# Patient Record
Sex: FEMALE | Race: WHITE
[De-identification: names, ages, dates, MRNs, and addresses within clinical notes are randomized per-mention and may not be internally consistent; named-entity substitution may affect disease eponyms.]

---

## 2018-05-24 LAB
BASOPHILS # BLD AUTO: 0.1 10*3/UL (ref 0–0.1)
BASOPHILS NFR BLD AUTO: 1.5 % (ref 0–1)
DEPRECATED NEUTROPHILS # BLD AUTO: 3.5 10*3/UL (ref 2.1–6.9)
EOSINOPHIL # BLD AUTO: 0.6 10*3/UL (ref 0–0.4)
EOSINOPHIL NFR BLD AUTO: 8.2 % (ref 0–6)
ERYTHROCYTE [DISTWIDTH] IN CORD BLOOD: 13.5 % (ref 11.7–14.4)
HCT VFR BLD AUTO: 36.6 % (ref 34.2–44.1)
HGB BLD-MCNC: 12.1 G/DL (ref 12–16)
LYMPHOCYTES # BLD: 2 10*3/UL (ref 1–3.2)
LYMPHOCYTES NFR BLD AUTO: 30.1 % (ref 18–39.1)
MCH RBC QN AUTO: 30.3 PG (ref 28–32)
MCHC RBC AUTO-ENTMCNC: 33.1 G/DL (ref 31–35)
MCV RBC AUTO: 91.7 FL (ref 81–99)
MONOCYTES # BLD AUTO: 0.6 10*3/UL (ref 0.2–0.8)
MONOCYTES NFR BLD AUTO: 8.2 % (ref 4.4–11.3)
NEUTS SEG NFR BLD AUTO: 51.7 % (ref 38.7–80)
PLATELET # BLD AUTO: 226 X10E3/UL (ref 140–360)
RBC # BLD AUTO: 3.99 X10E6/UL (ref 3.6–5.1)

## 2018-05-31 ENCOUNTER — HOSPITAL ENCOUNTER (OUTPATIENT)
Dept: HOSPITAL 88 - OR | Age: 68
Discharge: HOME | End: 2018-05-31
Attending: INTERNAL MEDICINE
Payer: MEDICARE

## 2018-05-31 DIAGNOSIS — Z12.11: Primary | ICD-10-CM

## 2018-05-31 DIAGNOSIS — Z87.891: ICD-10-CM

## 2018-05-31 DIAGNOSIS — B18.2: ICD-10-CM

## 2018-05-31 DIAGNOSIS — I10: ICD-10-CM

## 2018-05-31 DIAGNOSIS — Z01.812: ICD-10-CM

## 2018-05-31 DIAGNOSIS — R00.1: ICD-10-CM

## 2018-05-31 DIAGNOSIS — Z01.810: ICD-10-CM

## 2018-05-31 DIAGNOSIS — Z88.5: ICD-10-CM

## 2018-05-31 DIAGNOSIS — K57.30: ICD-10-CM

## 2018-05-31 DIAGNOSIS — Z79.82: ICD-10-CM

## 2018-05-31 DIAGNOSIS — K63.5: ICD-10-CM

## 2018-05-31 DIAGNOSIS — Z80.0: ICD-10-CM

## 2018-05-31 DIAGNOSIS — I25.810: ICD-10-CM

## 2018-05-31 DIAGNOSIS — I25.2: ICD-10-CM

## 2018-05-31 DIAGNOSIS — K21.9: ICD-10-CM

## 2018-05-31 DIAGNOSIS — Z95.1: ICD-10-CM

## 2018-05-31 PROCEDURE — 93005 ELECTROCARDIOGRAM TRACING: CPT

## 2018-05-31 PROCEDURE — 85025 COMPLETE CBC W/AUTO DIFF WBC: CPT

## 2018-05-31 PROCEDURE — 36415 COLL VENOUS BLD VENIPUNCTURE: CPT

## 2018-05-31 PROCEDURE — 45384 COLONOSCOPY W/LESION REMOVAL: CPT

## 2018-05-31 PROCEDURE — 88305 TISSUE EXAM BY PATHOLOGIST: CPT

## 2018-05-31 NOTE — OPERATIVE REPORT
DATE OF PROCEDURE:  May 31, 2018 

 

PROCEDURE PERFORMED:  Colonoscopy.



PREOPERATIVE DIAGNOSIS:  Colon cancer screening.



POSTOPERATIVE DIAGNOSES  

1. A 4-mm size, benign, sessile polyp in the descending colon, removed 

with hot biopsy forceps.  

2. Diverticulosis throughout the colon, but no diverticulitis.



PREOPERATIVE MEDICATIONS:  Consisted of MAC anesthesia.



DESCRIPTION OF PROCEDURE:  Using the [employer] video colonoscope, it was 

inserted in the patient's rectum and advanced without difficulty to the 

level of the cecum.  The colon was studied from that level back down to the 

rectum.  Diverticula were present throughout the entire colon without 

evidence of diverticulitis.  In the mid descending colon, a 4-mm size, 

benign, sessile polyp was seen and removed with hot biopsy forceps.  The 

colonoscope was withdrawn back to the rectum, and the procedure was ended.



In conclusion, we have findings of benign, 4-mm size polyp in the mid 

descending colon and diverticulosis of the colon.  







DD:  05/31/2018 09:25

DT:  05/31/2018 10:35

Job#:  [id number]

## 2021-07-06 ENCOUNTER — HOSPITAL ENCOUNTER (OUTPATIENT)
Dept: HOSPITAL 88 - CT | Age: 71
End: 2021-07-06
Attending: INTERNAL MEDICINE
Payer: MEDICARE

## 2021-07-06 DIAGNOSIS — I65.22: Primary | ICD-10-CM

## 2021-07-06 PROCEDURE — 70498 CT ANGIOGRAPHY NECK: CPT
